# Patient Record
(demographics unavailable — no encounter records)

---

## 2025-07-11 NOTE — HISTORY OF PRESENT ILLNESS
[FreeTextEntry1] : 77F w/ HFmREF, HTN, HLD, severe AS. and a PSHx of right renal transplant in 2006 2/2 APCKD (on home cellcept, prednisone, tacrolimus) who initially presented to [facility] w/ SOB, cough and weakness. Pt was admitted to medicine telemetry for management of acute hypoxic respiratory failure and sepsis 2/2 flu and CAP. CCB by elevated trops likely demand ischemia, TTE with large pericardial effusion and severe aortic stenosis, cardiology and structural cardiology consulted and TAVR studies performed. Hospital course c/b nonoliguric VAL likely secondary to supratherapeutic tacro level. Was holding tacrolimus until within therapeutic range. Restarted on 5/23/25. Pt now presents for post-discharge f/u. Pt overall feeling well. Denies any c/o chest pain, palpitations, SOB, or JOHNSON.   7/11/25 ECG: NSR at 79 bpm, NSTWC

## 2025-07-11 NOTE — PHYSICAL EXAM
[Well Developed] : well developed [Well Nourished] : well nourished [No Acute Distress] : no acute distress [Normal Conjunctiva] : normal conjunctiva [Normal Venous Pressure] : normal venous pressure [No Carotid Bruit] : no carotid bruit [Normal S1, S2] : normal S1, S2 [No Rub] : no rub [No Gallop] : no gallop [Clear Lung Fields] : clear lung fields [Good Air Entry] : good air entry [No Respiratory Distress] : no respiratory distress  [Soft] : abdomen soft [Non Tender] : non-tender [No Masses/organomegaly] : no masses/organomegaly [Normal Bowel Sounds] : normal bowel sounds [Normal Gait] : normal gait [No Edema] : no edema [No Cyanosis] : no cyanosis [No Clubbing] : no clubbing [No Varicosities] : no varicosities [No Rash] : no rash [No Skin Lesions] : no skin lesions [Moves all extremities] : moves all extremities [No Focal Deficits] : no focal deficits [Normal Speech] : normal speech [Alert and Oriented] : alert and oriented [Normal memory] : normal memory [Murmur] : murmur [de-identified] : 3/6 SANTIAGO RSB 2 ICS

## 2025-07-11 NOTE — PHYSICAL EXAM
[Well Developed] : well developed [Well Nourished] : well nourished [No Acute Distress] : no acute distress [Normal Conjunctiva] : normal conjunctiva [Normal Venous Pressure] : normal venous pressure [No Carotid Bruit] : no carotid bruit [Normal S1, S2] : normal S1, S2 [No Rub] : no rub [No Gallop] : no gallop [Clear Lung Fields] : clear lung fields [Good Air Entry] : good air entry [No Respiratory Distress] : no respiratory distress  [Soft] : abdomen soft [Non Tender] : non-tender [No Masses/organomegaly] : no masses/organomegaly [Normal Bowel Sounds] : normal bowel sounds [Normal Gait] : normal gait [No Edema] : no edema [No Cyanosis] : no cyanosis [No Clubbing] : no clubbing [No Varicosities] : no varicosities [No Rash] : no rash [No Skin Lesions] : no skin lesions [Moves all extremities] : moves all extremities [No Focal Deficits] : no focal deficits [Normal Speech] : normal speech [Alert and Oriented] : alert and oriented [Normal memory] : normal memory [Murmur] : murmur [de-identified] : 3/6 SANTIAGO RSB 2 ICS

## 2025-07-11 NOTE — ASSESSMENT
[FreeTextEntry1] : # HFmrEF - states that she ran out of her coreg rx and has been taking losartan 25 mg qd instead - coreg 3.125 mg bid rx sent to pharmacy - check labs - check TTE - further recommendations pending results  # severe AI - 3/2024 TTE c/w severe aortic stenosis - 5/16/25 TTE c/w severe aortic stenosis - has not f/u w/ structural - b/l carotid US wnl - TAVR studies performed 5/21/25 - re-refer to structural heart to discuss surgical intervention (Dr. England) - check TTE  # pericardial effusion - 3/2024 TTE c/w severe aortic stenosis, LVH, moderate pericardial effusion measuring up to 1.5cm, grade II diastolic dysfunction, moderately reduced EF - 5/16/25 TTE c/w severe aortic stenosis, large pericardial effusion with no tamponade physiology, LVEF 40%, mild MR, TR, PASP 41mmHg - amyloidosis w/u negative (K/L ratio and PYP scan nl) - 1.5L fluid restriction - states that she ran out of her coreg rx and has been taking losartan 25 mg qd instead - coreg 3.125 mg bid rx sent to pharmacy - check TTE  # HTN - BP today 172/82 - uncontrolled - states that she ran out of her coreg rx and has been taking losartan 25 mg qd instead - coreg 3.125 mg bid rx sent to pharmacy  # HLD - check labs  RTC after

## 2025-07-11 NOTE — HISTORY OF PRESENT ILLNESS
[FreeTextEntry1] : 77F w/ HFmREF, HTN, HLD, severe AS. and a PSHx of right renal transplant in 2006 2/2 APCKD (on home cellcept, prednisone, tacrolimus) who initially presented to [facility] w/ SOB, cough and weakness. Pt was admitted to medicine telemetry for management of acute hypoxic respiratory failure and sepsis 2/2 flu and CAP. CCB by elevated trops likely demand ischemia, TTE with large pericardial effusion and severe aortic stenosis, cardiology and structural cardiology consulted and TAVR studies performed. Hospital course c/b nonoliguric VAL likely secondary to supratherapeutic tacro level. Was holding tacrolimus until within therapeutic range. Restarted on 5/23/25. Pt now presents for post-discharge f/u. Pt overall feeling well. Denies any c/o chest pain, palpitations, SOB, or JOHNSON.   7/11/25 ECG: NSR at 79 bpm, NSTWC

## 2025-07-14 NOTE — REASON FOR VISIT
[Family Member] : family member [Home] : at home, [unfilled] , at the time of the visit. [Medical Office: ([facility])___] : at the medical office located in  [No access to tele-video equipment] : patient lacks access to tele-video equipment. [Verbal consent obtained from patient] : the patient, [unfilled]

## 2025-07-14 NOTE — REASON FOR VISIT
[Family Member] : family member [Home] : at home, [unfilled] , at the time of the visit. [Medical Office: ([facility])___] : at the medical office located in  [No access to tele-video equipment] : patient lacks access to tele-video equipment. [Verbal consent obtained from patient] : the patient, [unfilled]

## 2025-07-15 NOTE — ASSESSMENT
[FreeTextEntry1] : # HFmrEF - 7/2025 BnP ~42391, previously ~66500 during hospitalization - not fluid overloaded on exam - 1.5L fluid restriction - increase coreg to 6.25 mg bid  - check TTE - further recommendations pending results  # severe AI - 3/2024 TTE c/w severe aortic stenosis - 5/16/25 TTE c/w severe aortic stenosis - has not f/u w/ structural - b/l carotid US wnl - TAVR studies performed 5/21/25 - re-refer to structural heart to discuss surgical intervention (Dr. England) - check TTE  # pericardial effusion - 3/2024 TTE c/w severe aortic stenosis, LVH, moderate pericardial effusion measuring up to 1.5cm, grade II diastolic dysfunction, moderately reduced EF - 5/16/25 TTE c/w severe aortic stenosis, large pericardial effusion with no tamponade physiology, LVEF 40%, mild MR, TR, PASP 41mmHg - amyloidosis w/u negative (K/L ratio and PYP scan nl) - 1.5L fluid restriction - increase coreg to 6.25 mg bid  - check TTE  # HTN - uncontrolled - states that she ran out of her coreg rx and has been taking losartan 25 mg qd instead - increase coreg to 6.25 mg bid  - BP check Friday 7/18  # HLD - 7/2025 lipid panel c/w , HDL 98, ,  - results discussed in detail with pt - ed done   RTC Friday for BP check  Spent 11 minutes on telehealth/phone visit, all questions answered in detail.

## 2025-07-15 NOTE — HISTORY OF PRESENT ILLNESS
[FreeTextEntry1] : 77F w/ HFmREF, HTN, HLD, severe AS. and a PSHx of right renal transplant in 2006 2/2 APCKD (on home cellcept, prednisone, tacrolimus) who initially presented to [facility] w/ SOB, cough and weakness. Pt was admitted to medicine telemetry for management of acute hypoxic respiratory failure and sepsis 2/2 flu and CAP. CCB by elevated trops likely demand ischemia, TTE with large pericardial effusion and severe aortic stenosis, cardiology and structural cardiology consulted and TAVR studies performed. Hospital course c/b nonoliguric VAL likely secondary to supratherapeutic tacro level. Was holding tacrolimus until within therapeutic range. Restarted on 5/23/25. Pt now presents for post-discharge f/u. Pt overall feeling well. Denies any c/o chest pain, palpitations, SOB, or JOHNSON.   7/14/25 TH: pt returns today via TH for results of labs. Overall feeling well since last seen, no new complaints.  7/11/25 ECG: NSR at 79 bpm, NSTWC

## 2025-07-15 NOTE — HISTORY OF PRESENT ILLNESS
[FreeTextEntry1] : 77F w/ HFmREF, HTN, HLD, severe AS. and a PSHx of right renal transplant in 2006 2/2 APCKD (on home cellcept, prednisone, tacrolimus) who initially presented to [facility] w/ SOB, cough and weakness. Pt was admitted to medicine telemetry for management of acute hypoxic respiratory failure and sepsis 2/2 flu and CAP. CCB by elevated trops likely demand ischemia, TTE with large pericardial effusion and severe aortic stenosis, cardiology and structural cardiology consulted and TAVR studies performed. Hospital course c/b nonoliguric VAL likely secondary to supratherapeutic tacro level. Was holding tacrolimus until within therapeutic range. Restarted on 5/23/25. Pt now presents for post-discharge f/u. Pt overall feeling well. Denies any c/o chest pain, palpitations, SOB, or JOHNSON.   7/14/25 TH: pt returns today via TH for results of labs. Overall feeling well since last seen, no new complaints.  7/11/25 ECG: NSR at 79 bpm, NSTWC

## 2025-07-15 NOTE — ASSESSMENT
[FreeTextEntry1] : # HFmrEF - 7/2025 BnP ~61040, previously ~26338 during hospitalization - not fluid overloaded on exam - 1.5L fluid restriction - increase coreg to 6.25 mg bid  - check TTE - further recommendations pending results  # severe AI - 3/2024 TTE c/w severe aortic stenosis - 5/16/25 TTE c/w severe aortic stenosis - has not f/u w/ structural - b/l carotid US wnl - TAVR studies performed 5/21/25 - re-refer to structural heart to discuss surgical intervention (Dr. England) - check TTE  # pericardial effusion - 3/2024 TTE c/w severe aortic stenosis, LVH, moderate pericardial effusion measuring up to 1.5cm, grade II diastolic dysfunction, moderately reduced EF - 5/16/25 TTE c/w severe aortic stenosis, large pericardial effusion with no tamponade physiology, LVEF 40%, mild MR, TR, PASP 41mmHg - amyloidosis w/u negative (K/L ratio and PYP scan nl) - 1.5L fluid restriction - increase coreg to 6.25 mg bid  - check TTE  # HTN - uncontrolled - states that she ran out of her coreg rx and has been taking losartan 25 mg qd instead - increase coreg to 6.25 mg bid  - BP check Friday 7/18  # HLD - 7/2025 lipid panel c/w , HDL 98, ,  - results discussed in detail with pt - ed done   RTC Friday for BP check  Spent 11 minutes on telehealth/phone visit, all questions answered in detail.

## 2025-07-18 NOTE — HISTORY OF PRESENT ILLNESS
[FreeTextEntry1] : 77F w/ HFmREF, HTN, HLD, severe AS. and a PSHx of right renal transplant in 2006 2/2 APCKD (on home cellcept, prednisone, tacrolimus) who initially presented to [facility] w/ SOB, cough and weakness. Pt was admitted to medicine telemetry for management of acute hypoxic respiratory failure and sepsis 2/2 flu and CAP. CCB by elevated trops likely demand ischemia, TTE with large pericardial effusion and severe aortic stenosis, cardiology and structural cardiology consulted and TAVR studies performed. Hospital course c/b nonoliguric VAL likely secondary to supratherapeutic tacro level. Was holding tacrolimus until within therapeutic range. Restarted on 5/23/25. Pt now presents for post-discharge f/u. Pt overall feeling well. Denies any c/o chest pain, palpitations, SOB, or JOHNSON.   7/18/25 OV: pt returns today for BP check. Overall feeling well since last seen, no new complaints.  7/14/25 TH: pt returns today via TH for results of labs. Overall feeling well since last seen, no new complaints.  7/11/25 ECG: NSR at 79 bpm, NSTWC

## 2025-07-18 NOTE — HISTORY OF PRESENT ILLNESS
[FreeTextEntry1] : 77F w/ HFmREF, HTN, HLD, severe AS. and a PSHx of right renal transplant in 2006 2/2 APCKD (on home cellcept, prednisone, tacrolimus) who initially presented to [facility] w/ SOB, cough and weakness. Pt was admitted to medicine telemetry for management of acute hypoxic respiratory failure and sepsis 2/2 flu and CAP. CCB by elevated trops likely demand ischemia, TTE with large pericardial effusion and severe aortic stenosis, cardiology and structural cardiology consulted and TAVR studies performed. Hospital course c/b nonoliguric VAL likely secondary to supratherapeutic tacro level. Was holding tacrolimus until within therapeutic range. Restarted on 5/23/25. Pt now presents for post-discharge f/u. Pt overall feeling well. Denies any c/o chest pain, palpitations, SOB, or JOHNSON.   7/18/25 OV: pt returns today for BP check. Overall feeling well since last seen, no new complaints.  7/14/25 TH: pt returns today via TH for results of labs. Overall feeling well since last seen, no new complaints.  7/11/25 ECG: NSR at 79 bpm, NSTWC  Left arm;

## 2025-07-18 NOTE — ASSESSMENT
[FreeTextEntry1] : # HFmrEF - 7/2025 BnP ~47993, previously ~17132 during hospitalization - not fluid overloaded on exam - 1.5L fluid restriction - coreg previously increased to 6.25 mg bid - increase coreg to 12.5 mg bid - check TTE - further recommendations pending results  # severe AI - 3/2024 TTE c/w severe aortic stenosis - 5/16/25 TTE c/w severe aortic stenosis - has not f/u w/ structural - b/l carotid US wnl - TAVR studies performed 5/21/25 - re-refer to structural heart to discuss surgical intervention (Dr. England) - check TTE  # pericardial effusion - 3/2024 TTE c/w severe aortic stenosis, LVH, moderate pericardial effusion measuring up to 1.5cm, grade II diastolic dysfunction, moderately reduced EF - 5/16/25 TTE c/w severe aortic stenosis, large pericardial effusion with no tamponade physiology, LVEF 40%, mild MR, TR, PASP 41mmHg - amyloidosis w/u negative (K/L ratio and PYP scan nl) - 1.5L fluid restriction - coreg previously increased to 6.25 mg bid - increase coreg to 12.5 mg bid - check TTE  # HTN - BP today 160/80 - still elevated - coreg previously increased to 6.25 mg bid - increase coreg to 12.5 mg bid  # HLD - 7/2025 lipid panel c/w , HDL 98, ,  - results discussed in detail with pt - ed done  # hearing loss - refer to ENT  RTC 1 week for BP check

## 2025-07-18 NOTE — ASSESSMENT
[FreeTextEntry1] : # HFmrEF - 7/2025 BnP ~29241, previously ~76240 during hospitalization - not fluid overloaded on exam - 1.5L fluid restriction - coreg previously increased to 6.25 mg bid - increase coreg to 12.5 mg bid - check TTE - further recommendations pending results  # severe AI - 3/2024 TTE c/w severe aortic stenosis - 5/16/25 TTE c/w severe aortic stenosis - has not f/u w/ structural - b/l carotid US wnl - TAVR studies performed 5/21/25 - re-refer to structural heart to discuss surgical intervention (Dr. England) - check TTE  # pericardial effusion - 3/2024 TTE c/w severe aortic stenosis, LVH, moderate pericardial effusion measuring up to 1.5cm, grade II diastolic dysfunction, moderately reduced EF - 5/16/25 TTE c/w severe aortic stenosis, large pericardial effusion with no tamponade physiology, LVEF 40%, mild MR, TR, PASP 41mmHg - amyloidosis w/u negative (K/L ratio and PYP scan nl) - 1.5L fluid restriction - coreg previously increased to 6.25 mg bid - increase coreg to 12.5 mg bid - check TTE  # HTN - BP today 160/80 - still elevated - coreg previously increased to 6.25 mg bid - increase coreg to 12.5 mg bid  # HLD - 7/2025 lipid panel c/w , HDL 98, ,  - results discussed in detail with pt - ed done  # hearing loss - refer to ENT  RTC 1 week for BP check

## 2025-07-24 NOTE — ASSESSMENT
[FreeTextEntry1] : # HFmrEF - 7/2025 BnP ~86099, previously ~72369 during hospitalization - not fluid overloaded on exam - 1.5L fluid restriction - coreg previously increased to 6.25 mg bid - increase coreg to 12.5 mg bid - 7/2025 TTE c/w   # severe AI - 3/2024 TTE c/w severe aortic stenosis - 5/16/25 TTE c/w severe aortic stenosis - has not f/u w/ structural - b/l carotid US wnl - TAVR studies performed 5/21/25 - re-refer to structural heart to discuss surgical intervention (Dr. England) - 7/2025 TTE c/w   # pericardial effusion - 3/2024 TTE c/w severe aortic stenosis, LVH, moderate pericardial effusion measuring up to 1.5cm, grade II diastolic dysfunction, moderately reduced EF - 5/16/25 TTE c/w severe aortic stenosis, large pericardial effusion with no tamponade physiology, LVEF 40%, mild MR, TR, PASP 41mmHg - amyloidosis w/u negative (K/L ratio and PYP scan nl) - 1.5L fluid restriction - coreg previously increased to 6.25 mg bid - increase coreg to 12.5 mg bid - 7/2025 TTE c/w   # HTN - BP today 160/80 - still elevated - coreg previously increased to 6.25 mg bid - increase coreg to 12.5 mg bid  # HLD - 7/2025 lipid panel c/w , HDL 98, ,  - results discussed in detail with pt - ed done  # hearing loss - refer to ENT

## 2025-07-24 NOTE — HISTORY OF PRESENT ILLNESS
[FreeTextEntry1] : 77F w/ HFmREF, HTN, HLD, severe AS. and a PSHx of right renal transplant in 2006 2/2 APCKD (on home cellcept, prednisone, tacrolimus) who initially presented to [facility] w/ SOB, cough and weakness. Pt was admitted to medicine telemetry for management of acute hypoxic respiratory failure and sepsis 2/2 flu and CAP. CCB by elevated trops likely demand ischemia, TTE with large pericardial effusion and severe aortic stenosis, cardiology and structural cardiology consulted and TAVR studies performed. Hospital course c/b nonoliguric VAL likely secondary to supratherapeutic tacro level. Was holding tacrolimus until within therapeutic range. Restarted on 5/23/25. Pt now presents for post-discharge f/u. Pt overall feeling well. Denies any c/o chest pain, palpitations, SOB, or JOHNSON.   7/25/25 OV: pt returns today for BP check and results of TTE 7/18/25 OV: pt returns today for BP check. Overall feeling well since last seen, no new complaints.  7/14/25 TH: pt returns today via TH for results of labs. Overall feeling well since last seen, no new complaints.  7/11/25 ECG: NSR at 79 bpm, NSTWC

## 2025-07-25 NOTE — PROCEDURE
[Cerumen Impaction] : Cerumen Impaction [Same] : same as the Pre Op Dx. [] : Removal of Cerumen [FreeTextEntry6] : b copious cerumen impaction removed atraumatically with suction under microscope, b mild o.e.

## 2025-07-25 NOTE — ASSESSMENT
[FreeTextEntry1] : # HFmrEF - 7/2025 BnP ~52953, previously ~82087 during hospitalization - not fluid overloaded on exam - 1.5L fluid restriction - cont coreg 12.5 mg bid - 7/2025 results pending   # HTN - BP today 122/64 - improved  - cont coreg 12.5 mg bid  # severe AI - 3/2024 TTE c/w severe aortic stenosis - 5/16/25 TTE c/w severe aortic stenosis - has not f/u w/ structural - b/l carotid US wnl - TAVR studies performed 5/21/25 - re-refer to structural heart to discuss surgical intervention (Dr. England) - 7/2025 results pending   # pericardial effusion - 3/2024 TTE c/w severe aortic stenosis, LVH, moderate pericardial effusion measuring up to 1.5cm, grade II diastolic dysfunction, moderately reduced EF - 5/16/25 TTE c/w severe aortic stenosis, large pericardial effusion with no tamponade physiology, LVEF 40%, mild MR, TR, PASP 41mmHg - amyloidosis w/u negative (K/L ratio and PYP scan nl) - 1.5L fluid restriction - coreg previously increased to 6.25 mg bid - cont coreg 12.5 mg bid - 7/2025 results pending   # HLD - 7/2025 lipid panel c/w , HDL 98, ,  - results discussed in detail with pt - ed done  RTC 6 weeks

## 2025-07-25 NOTE — HISTORY OF PRESENT ILLNESS
[FreeTextEntry1] : 78F w/ HFmREF, HTN, HLD, severe AS. and a PSHx of right renal transplant in 2006 2/2 APCKD (on home cellcept, prednisone, tacrolimus) who initially presented to [facility] w/ SOB, cough and weakness. Pt was admitted to medicine telemetry for management of acute hypoxic respiratory failure and sepsis 2/2 flu and CAP. CCB by elevated trops likely demand ischemia, TTE with large pericardial effusion and severe aortic stenosis, cardiology and structural cardiology consulted and TAVR studies performed. Hospital course c/b nonoliguric VAL likely secondary to supratherapeutic tacro level. Was holding tacrolimus until within therapeutic range. Restarted on 5/23/25. Pt now presents for post-discharge f/u. Pt overall feeling well. Denies any c/o chest pain, palpitations, SOB, or JOHNSON.   7/25/25 OV: pt returns today for BP check. Overall feeling well since last seen. Compliant w/ all meds. Saw ENT yesterday.  7/18/25 OV: pt returns today for BP check. Overall feeling well since last seen, no new complaints.  7/14/25 TH: pt returns today via TH for results of labs. Overall feeling well since last seen, no new complaints.  7/11/25 ECG: NSR at 79 bpm, NSTWC

## 2025-07-25 NOTE — PHYSICAL EXAM
[de-identified] : b copious cerumen impaction removed atraumatically with suction, l > r mild o.e., tms intact [Midline] : trachea located in midline position [Normal] : no neck adenopathy [de-identified] : gait steady

## 2025-07-25 NOTE — HISTORY OF PRESENT ILLNESS
[FreeTextEntry1] : 78F w/ HFmREF, HTN, HLD, severe AS. and a PSHx of right renal transplant in 2006 2/2 APCKD (on home cellcept, prednisone, tacrolimus) who initially presented to [facility] w/ SOB, cough and weakness. Pt was admitted to medicine telemetry for management of acute hypoxic respiratory failure and sepsis 2/2 flu and CAP. CCB by elevated trops likely demand ischemia, TTE with large pericardial effusion and severe aortic stenosis, cardiology and structural cardiology consulted and TAVR studies performed. Hospital course c/b nonoliguric VAL likely secondary to supratherapeutic tacro level. Was holding tacrolimus until within therapeutic range. Restarted on 5/23/25. Pt now presents for post-discharge f/u. Pt overall feeling well. Denies any c/o chest pain, palpitations, SOB, or JOHNSON.   7/25/25 OV: pt returns today for BP check. Overall feeling well since last seen. Compliant w/ all meds. Saw ENT yesterday.  7/18/25 OV: pt returns today for BP check. Overall feeling well since last seen, no new complaints.  7/14/25 TH: pt returns today via TH for results of labs. Overall feeling well since last seen, no new complaints.  7/11/25 ECG: NSR at 79 bpm, NSTWC

## 2025-07-25 NOTE — HISTORY OF PRESENT ILLNESS
[de-identified] : 79 yo f here with her daughter who helps translate. She has h/o renal transplant. She had a sinus infx and went to ENT over a year ago. She was treated for sinus infection and explained she had tinnitus and echo in left ear. Saw an otolaryngologist and was told she had significant l hl and that she should have steroids injected into her ear. She wanted a second opinion. Happened > 1 yr ago. She c/o l hl and l fullness, drainage and pain for months-years. Uses qtips chronically- advised to stop. No fh or sh relevant to cc.

## 2025-07-25 NOTE — HISTORY OF PRESENT ILLNESS
[de-identified] : 77 yo f here with her daughter who helps translate. She has h/o renal transplant. She had a sinus infx and went to ENT over a year ago. She was treated for sinus infection and explained she had tinnitus and echo in left ear. Saw an otolaryngologist and was told she had significant l hl and that she should have steroids injected into her ear. She wanted a second opinion. Happened > 1 yr ago. She c/o l hl and l fullness, drainage and pain for months-years. Uses qtips chronically- advised to stop. No fh or sh relevant to cc.

## 2025-07-25 NOTE — PHYSICAL EXAM
[de-identified] : b copious cerumen impaction removed atraumatically with suction, l > r mild o.e., tms intact [Midline] : trachea located in midline position [Normal] : no neck adenopathy [de-identified] : gait steady

## 2025-07-25 NOTE — ASSESSMENT
[FreeTextEntry1] : # HFmrEF - 7/2025 BnP ~85171, previously ~92337 during hospitalization - not fluid overloaded on exam - 1.5L fluid restriction - cont coreg 12.5 mg bid - 7/2025 results pending   # HTN - BP today 122/64 - improved  - cont coreg 12.5 mg bid  # severe AI - 3/2024 TTE c/w severe aortic stenosis - 5/16/25 TTE c/w severe aortic stenosis - has not f/u w/ structural - b/l carotid US wnl - TAVR studies performed 5/21/25 - re-refer to structural heart to discuss surgical intervention (Dr. England) - 7/2025 results pending   # pericardial effusion - 3/2024 TTE c/w severe aortic stenosis, LVH, moderate pericardial effusion measuring up to 1.5cm, grade II diastolic dysfunction, moderately reduced EF - 5/16/25 TTE c/w severe aortic stenosis, large pericardial effusion with no tamponade physiology, LVEF 40%, mild MR, TR, PASP 41mmHg - amyloidosis w/u negative (K/L ratio and PYP scan nl) - 1.5L fluid restriction - coreg previously increased to 6.25 mg bid - cont coreg 12.5 mg bid - 7/2025 results pending   # HLD - 7/2025 lipid panel c/w , HDL 98, ,  - results discussed in detail with pt - ed done  RTC 6 weeks

## 2025-07-25 NOTE — ASSESSMENT
[FreeTextEntry1] : copious cerumen removed l>r b o.e. - dep, floxin, avoid qtips l dead ear, r hf snhl BICROS HA recommended - pt dicussed with audiology and I took her to  rtc 1 week with mri 5